# Patient Record
Sex: MALE | Race: WHITE | NOT HISPANIC OR LATINO | URBAN - METROPOLITAN AREA
[De-identification: names, ages, dates, MRNs, and addresses within clinical notes are randomized per-mention and may not be internally consistent; named-entity substitution may affect disease eponyms.]

---

## 2017-11-02 ENCOUNTER — IMPORTED ENCOUNTER (OUTPATIENT)
Dept: URBAN - METROPOLITAN AREA CLINIC 50 | Facility: CLINIC | Age: 82
End: 2017-11-02

## 2017-11-22 ENCOUNTER — IMPORTED ENCOUNTER (OUTPATIENT)
Dept: URBAN - METROPOLITAN AREA CLINIC 50 | Facility: CLINIC | Age: 82
End: 2017-11-22

## 2017-11-30 ENCOUNTER — IMPORTED ENCOUNTER (OUTPATIENT)
Dept: URBAN - METROPOLITAN AREA CLINIC 50 | Facility: CLINIC | Age: 82
End: 2017-11-30

## 2018-01-04 ENCOUNTER — IMPORTED ENCOUNTER (OUTPATIENT)
Dept: URBAN - METROPOLITAN AREA CLINIC 50 | Facility: CLINIC | Age: 83
End: 2018-01-04

## 2019-10-21 NOTE — PATIENT DISCUSSION
New Prescription: Maxitrol (neomycin-polymyxin-dexameth): ointment: 3.5-10,000-0.1 mg-unit/g-% 1 a small amount three times a day as directed into right eye 10-

## 2019-11-11 NOTE — PATIENT DISCUSSION
Continue: Maxitrol (neomycin-polymyxin-dexameth): ointment: 3.5-10,000-0.1 mg-unit/g-% 1 a small amount three times a day as directed into right eye 10-

## 2020-07-13 NOTE — PATIENT DISCUSSION
- Follow up in 1 year for [facility] SERVICES OF [facility], [facility]

## 2020-07-13 NOTE — PATIENT DISCUSSION
Stopped Today: Maxitrol (neomycin-polymyxin-dexameth): ointment: 3.5-10,000-0.1 mg-unit/g-% 1 a small amount three times a day as directed into right eye 10-

## 2020-09-10 NOTE — PATIENT DISCUSSION
- Follow up in July 2021 for annual [place] [facility]

## 2021-04-17 ASSESSMENT — VISUAL ACUITY
OD_OTHER: 20/30. 20/40.
OS_OTHER: 20/50. 20/70.
OS_CC: 20/20
OS_CC: J1+NEAR
OD_CC: J1+NEAR
OD_CC: 20/20
OS_CC: 20/20
OD_CC: J1+
OD_CC: 20/20
OS_BAT: 20/50
OS_CC: J1+
OD_BAT: 20/30

## 2021-04-17 ASSESSMENT — TONOMETRY
OS_IOP_MMHG: 15
OS_IOP_MMHG: 18
OD_IOP_MMHG: 18
OD_IOP_MMHG: 13

## 2021-09-15 NOTE — PATIENT DISCUSSION
- Follow up in 1 year for [facility] SERVICES OF [facility], [facility]